# Patient Record
Sex: FEMALE | Race: WHITE
[De-identification: names, ages, dates, MRNs, and addresses within clinical notes are randomized per-mention and may not be internally consistent; named-entity substitution may affect disease eponyms.]

---

## 2019-08-01 ENCOUNTER — HOSPITAL ENCOUNTER (OUTPATIENT)
Dept: HOSPITAL 96 - M.CL | Age: 69
End: 2019-08-01
Attending: INTERNAL MEDICINE
Payer: COMMERCIAL

## 2019-08-01 VITALS — HEIGHT: 65 IN | BODY MASS INDEX: 39.38 KG/M2 | WEIGHT: 236.34 LBS

## 2019-08-01 VITALS — SYSTOLIC BLOOD PRESSURE: 148 MMHG | DIASTOLIC BLOOD PRESSURE: 76 MMHG

## 2019-08-01 VITALS — SYSTOLIC BLOOD PRESSURE: 170 MMHG | DIASTOLIC BLOOD PRESSURE: 87 MMHG

## 2019-08-01 VITALS — SYSTOLIC BLOOD PRESSURE: 145 MMHG | DIASTOLIC BLOOD PRESSURE: 76 MMHG

## 2019-08-01 VITALS — DIASTOLIC BLOOD PRESSURE: 95 MMHG | SYSTOLIC BLOOD PRESSURE: 138 MMHG

## 2019-08-01 VITALS — SYSTOLIC BLOOD PRESSURE: 134 MMHG | DIASTOLIC BLOOD PRESSURE: 75 MMHG

## 2019-08-01 VITALS — SYSTOLIC BLOOD PRESSURE: 172 MMHG | DIASTOLIC BLOOD PRESSURE: 90 MMHG

## 2019-08-01 VITALS — DIASTOLIC BLOOD PRESSURE: 85 MMHG | SYSTOLIC BLOOD PRESSURE: 145 MMHG

## 2019-08-01 VITALS — SYSTOLIC BLOOD PRESSURE: 150 MMHG | DIASTOLIC BLOOD PRESSURE: 85 MMHG

## 2019-08-01 DIAGNOSIS — Z79.899: ICD-10-CM

## 2019-08-01 DIAGNOSIS — E78.2: ICD-10-CM

## 2019-08-01 DIAGNOSIS — F17.210: ICD-10-CM

## 2019-08-01 DIAGNOSIS — I25.2: ICD-10-CM

## 2019-08-01 DIAGNOSIS — I10: ICD-10-CM

## 2019-08-01 DIAGNOSIS — Z98.890: ICD-10-CM

## 2019-08-01 DIAGNOSIS — E78.00: ICD-10-CM

## 2019-08-01 DIAGNOSIS — I25.41: ICD-10-CM

## 2019-08-01 DIAGNOSIS — Z88.8: ICD-10-CM

## 2019-08-01 DIAGNOSIS — I25.10: Primary | ICD-10-CM

## 2019-08-01 DIAGNOSIS — E11.9: ICD-10-CM

## 2019-08-01 LAB
ALBUMIN SERPL-MCNC: 3.3 G/DL (ref 3.4–5)
ALP SERPL-CCNC: 89 U/L (ref 46–116)
ALT SERPL-CCNC: 23 U/L (ref 30–65)
ANION GAP SERPL CALC-SCNC: 7 MMOL/L (ref 7–16)
APTT BLD: 25.9 SECONDS (ref 25–31.3)
AST SERPL-CCNC: 13 U/L (ref 15–37)
BILIRUB SERPL-MCNC: 0.3 MG/DL
BUN SERPL-MCNC: 10 MG/DL (ref 7–18)
CALCIUM SERPL-MCNC: 9.1 MG/DL (ref 8.5–10.1)
CHLORIDE SERPL-SCNC: 102 MMOL/L (ref 98–107)
CHOLEST SERPL-MCNC: 130 MG/DL (ref ?–200)
CO2 SERPL-SCNC: 31 MMOL/L (ref 21–32)
CREAT SERPL-MCNC: 1 MG/DL (ref 0.6–1.3)
GLUCOSE SERPL-MCNC: 199 MG/DL (ref 70–99)
HCT VFR BLD CALC: 43.3 % (ref 37–47)
HDLC SERPL-MCNC: 30 MG/DL (ref 40–?)
HGB BLD-MCNC: 14.8 GM/DL (ref 12–15)
INR PPP: 1
LDLC SERPL-MCNC: 54 MG/DL (ref ?–100)
MCH RBC QN AUTO: 31 PG (ref 26–34)
MCHC RBC AUTO-ENTMCNC: 34.1 G/DL (ref 28–37)
MCV RBC: 90.9 FL (ref 80–100)
MPV: 9.3 FL. (ref 7.2–11.1)
PLATELET COUNT*: 282 THOU/UL (ref 150–400)
POTASSIUM SERPL-SCNC: 3.4 MMOL/L (ref 3.5–5.1)
PROT SERPL-MCNC: 7.4 G/DL (ref 6.4–8.2)
PROTHROMBIN TIME: 10 SECONDS (ref 9.2–11.5)
RBC # BLD AUTO: 4.77 MIL/UL (ref 4.2–5)
RDW-CV: 13.4 % (ref 10.5–14.5)
SODIUM SERPL-SCNC: 140 MMOL/L (ref 136–145)
TC:HDL: 4.3 RATIO
TRIGL SERPL-MCNC: 230 MG/DL (ref ?–150)
VLDLC SERPL CALC-MCNC: 46 MG/DL (ref ?–40)
WBC # BLD AUTO: 9.8 THOU/UL (ref 4–11)

## 2019-08-01 NOTE — CARD
[address]  [address]                    CARDIAC CATH REPORT           
_______________________________________________________________________________
 
Name:       JJOLENAELIZABETH LAWLER      Room:                      REG DEWAYNE FLORES#:  [id number]      Account #:      [id number]  
Admission:  08/01/19     Attend Phys:    Guanaco Torres MD, 
Discharge:               Date of Birth:  [birth year]  
         Report #: [id number]
                                                                     [id number]
_______________________________________________________________________________
THIS REPORT FOR:  //name//                      
 
 
--------------- APPROVED REPORT --------------
 
 
Study performed:  08/01/2019 09:12:25
 
Patient Details
Patient Status: Out-Patient                  Room #: 
 
Event Personnel
Dr. Guanaco Torres
 
Procedures Performed
Left heart catheterization left ventriculography and selective 
coronary arteriography
 
Indication
Chest pain
 
Risk Factors
Hypercholesterolemia, Hypertension, Diabetes 
 
Procedure Narrative
The patient was brought electively to the Cardiac Catheterization 
Laboratory and was prepped and draped in a sterile manner. The right 
femoral was infiltrated with 2% Lidocaine subcutaneous anesthesia. A 
6 Frisian sheath was inserted into the right femoral artery. Coronary 
angiography was performed using coronary diagnostic catheters. The 
right coronary system was accessed and visualized with a Diagnostic 
catheter. The left coronary system was accessed and visualized with a 
Diagnostic catheter. The left ventricle was accessed and visualized 
with a Diagnostic catheter. Left ventricular/Aortic Valve gradient 
assessed via catheter pullback. Left ventriculogram was performed in 
HAQUE projection. Pre-demployment femoral angiogram was performed . 
Closure device was deployed with a 6 Fr Mynx. There was no 
hematoma.
 
Coronary Angiography
The patient's coronary anatomy is right dominant. 
 
Diagnostic Cath
Left Main 0% narrowing
LAD  0% narrowing
Circumflex 0% narrowing with mild aneurysmal dilatation of the 
 
 
 
[facility] 
[address]  [place]  [address]                    CARDIAC CATH REPORT           
_______________________________________________________________________________
 
Name:       OLENA GARDNER      Room:                      REG CLI 
M.R.#:  [id number]      Account #:      [id number]  
Admission:  08/01/19     Attend Phys:    Guanaco Torres MD, 
Discharge:               Date of Birth:  [birth year]  
         Report #: [id number]
                                                                     [id number]
_______________________________________________________________________________
midportion of the circumflex
Right Coronary Modest sized dominant vessel with 30% proximal 
narrowing
 
Left Ventriculography
The left ventricle is normal in size with normal contractility. The 
left ventricular ejection fraction is estimated to be 65%. Left 
ventricular wall motion abnormalities are not present. There is no 
mitral insufficiency.
 
Hemodynamics
The aortic pressure is 130/70 mmHg with a mean of 82 mmHg. The left 
ventricular end diastolic pressure is 8 mmHg. There was no gradient 
across the aortic valve upon pullback. 
 
Conclusion
#1 Mild coronary artery disease characterized by the following:
 
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
 
B 30% narrowing of the proximal portion of the modest size dominant 
right coronary artery
 
C normal left main and left anterior descending coronary arteries
 
#2 normal left-sided hemodynamics study
 
#3 normal left ventricular systolic function, estimated ejection 
fraction being 65%. 
 
Recommendations
Cardiac Risk Reduction Program
 
Diagnostic Cath Approved by: Guanaco Torres MD        Date/Time: 
08/01/2019 11:27:40
 
 
 
 
 
 
 
 
 
<ELECTRONICALLY SIGNED>
                                        By:  Guanaco Torres MD, [facility]     
08/01/19     1128
D: 08/01/19 1128_______________________________________
T: 08/01/19 1128Jomarie Torres MD, [facility]        /INF

## 2019-08-01 NOTE — EKG
[place] [address]
Phone:  [phone]                     ELECTROCARDIOGRAM REPORT      
_______________________________________________________________________________
 
Name:       OLENA GARDNER      Room:                      REG CLI 
M.R.#:  [id number]      Account #:      [id number]  
Admission:  19     Attend Phys:    Guanaco Torres MD, 
Discharge:               Date of Birth:  [birth year]  
         Report #: [id number]
    [id number]
_______________________________________________________________________________
THIS REPORT FOR:  //name//                      
 
                          [facility]
                                       
Test Date:    2019               Test Time:    08:52:02
Pat Name:     OLENA GARDNER        Department:   
Patient ID:   [id number]            Room:         [facility]
Gender:                               Technician:   
:          1950               Requested By: Guanaco Torres
Order Number: [id number]    Venus MD:   Guanaco Torres
                                 Measurements
Intervals                              Axis          
Rate:         69                       P:            -77
KY:           106                      QRS:          13
QRSD:         107                      T:            45
QT:           427                                    
QTc:          458                                    
                           Interpretive Statements
Ectopic atrial rhythm
Short KY interval
Abnormal R-wave progression, early transition
Abnormal inferior Q waves
Borderline T abnormalities, anterior leads
No previous ECG available for comparison
 
Electronically Signed On 2019 12:09:38 CDT by Guanaco Torres
[email or web address]
 
 
 
 
 
 
 
 
 
 
 
 
 
 
 
 
  <ELECTRONICALLY SIGNED>
                                           By: Guanaco Torres MD, [facility]     
  19     1209
D: 19 0852   _____________________________________
T: 19 0852   Guanaco Torres MD, [facility]       /EPI